# Patient Record
(demographics unavailable — no encounter records)

---

## 2025-02-04 NOTE — PROCEDURE
[de-identified] :  Using sterile technique, 2cc of depomedrol 40mg/ml, 4cc of 1% plain lidocaine, and 2 cc 0.25% marcaine was drawn up into a sterile syringe. The left knee joint space was identified using ultrasound. The left knee was then sterilely prepped with chlorhexidine. Ethyl chloride spray was used to anesthetize the skin and subQ tissue. The depomedrol/lidocaine/marcaine mixture was then injected into the knee joint in the superolateral position under ultrasound guidance and the needle position in the joint space was confirmed. The patient tolerated the procedure well without difficulty. The patient was given instructions on the use of ice and anti-inflammatories post injection site soreness.  Using sterile technique, 2cc of depomedrol 40mg/ml, 4cc of 1% plain lidocaine, and 2 cc 0.25% marcaine was drawn up into a sterile syringe. The right knee joint space was identified using ultrasound. The right knee was then sterilely prepped with chlorhexidine. Ethyl chloride spray was used to anesthetize the skin and subQ tissue. The depomedrol/lidocaine/marcaine mixture was then injected into the knee joint in the superolateral position under ultrasound guidance and the needle position in the joint space was confirmed. The patient tolerated the procedure well without difficulty. The patient was given instructions on the use of ice and anti-inflammatories post injection site soreness.

## 2025-02-04 NOTE — DISCUSSION/SUMMARY
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 Nonoperative treatment options for knee arthritis were discussed including anti-inflammatories, physical therapy, intraarticular cortisone injections, and hyaluronic acid gel injections.   The patient received an intraarticular cortisone injection under ultrasound guidance in the bilateral knee in the office today. The patient will follow up 6 weeks post injection.

## 2025-02-04 NOTE — PHYSICAL EXAM
[de-identified] : The patient appears well nourished and in no apparent distress.  The patient is alert and oriented to person, place, and time.   Affect and mood appear normal. The head is normocephalic and atraumatic.  The eyes reveal normal sclera and extra ocular muscles are intact. The tongue is midline with no apparent lesions.  Skin shows normal turgor with no evidence of eczema or psoriasis.  No respiratory distress noted.  Sensation grossly intact.		  [de-identified] :  Exam of the left knee shows -5 to 125 degrees of flexion measured with a goniometer. There is a palpable baker's cyst.  Exam of the right knee shows 0 to 110 degrees of flexion measured with a goniometer. 5/5 motor strength bilaterally distally. Sensation intact distally. 	  [de-identified] : Xray 4 views right knee demonstrates bone on bone arthritis. 	  X-ray: 4 views of the left knee demonstrate bone on bone arthritis.

## 2025-02-04 NOTE — HISTORY OF PRESENT ILLNESS
[de-identified] : Patient is a 72-year-old female presenting for evaluation of bilateral knee pain left worse than right. Her pain is localized medially and posteriorly.  Pain is worse with deep flexion as well as weightbearing activity.  Patient denies any falls or trauma.  Patient was seen in office on 1/30/24 and given a steroid injection to he left knee. She states this worked very well at the time but now pain has come back.

## 2025-03-25 NOTE — HISTORY OF PRESENT ILLNESS
[FreeTextEntry1] : Ms. LEWIS FRIAS is a 73 year old female presents for screening colonoscopy. Patient has no complaints of bowel issues, bleeding, abdominal pain, family history of colon cancer, GERD symptoms. Patient's daughter had colonoscopy and found to have large polyp (did not require surgery by report). Last colonoscopy was in 2019. Patient was diagnosed with rectocele which did not require surgery.